# Patient Record
Sex: FEMALE | Race: WHITE | NOT HISPANIC OR LATINO | Employment: UNEMPLOYED | URBAN - METROPOLITAN AREA
[De-identification: names, ages, dates, MRNs, and addresses within clinical notes are randomized per-mention and may not be internally consistent; named-entity substitution may affect disease eponyms.]

---

## 2021-11-15 ENCOUNTER — HOSPITAL ENCOUNTER (EMERGENCY)
Facility: HOSPITAL | Age: 26
Discharge: HOME | End: 2021-11-15
Attending: EMERGENCY MEDICINE | Admitting: EMERGENCY MEDICINE

## 2021-11-15 VITALS
BODY MASS INDEX: 19.83 KG/M2 | SYSTOLIC BLOOD PRESSURE: 121 MMHG | HEART RATE: 78 BPM | OXYGEN SATURATION: 98 % | WEIGHT: 119.05 LBS | RESPIRATION RATE: 16 BRPM | TEMPERATURE: 98.2 F | DIASTOLIC BLOOD PRESSURE: 98 MMHG | HEIGHT: 65 IN

## 2021-11-15 DIAGNOSIS — F19.10 SUBSTANCE ABUSE (HCC): ICD-10-CM

## 2021-11-15 DIAGNOSIS — F41.0 ANXIETY ATTACK: Primary | ICD-10-CM

## 2021-11-15 LAB
BASE EXCESS BLDA CALC-SCNC: 2 MMOL/L (ref -5–5)
CA-I BLDA-SCNC: 1.32 MMOL/L (ref 1.2–1.32)
CO2 BLDA-SCNC: 29 MMOL/L (ref 24–29)
GLUCOSE BLDC GLUCOMTR-MCNC: 93 MG/DL (ref 70–130)
HCO3 BLDA-SCNC: 27.8 MMOL/L (ref 22–26)
HCT VFR BLDA CALC: 49 % (ref 38–51)
HGB BLDA-MCNC: 16.7 G/DL (ref 12–17)
PCO2 BLDA: 50.5 MM HG (ref 35–45)
PH BLDA: 7.35 PH UNITS (ref 7.35–7.6)
PO2 BLDA: 13 MMHG (ref 80–105)
POTASSIUM BLDA-SCNC: 3.7 MMOL/L (ref 3.5–4.9)
QT INTERVAL: 426 MS
QTC INTERVAL: 435 MS
SAO2 % BLDA: 13 % (ref 95–98)
SODIUM BLD-SCNC: 139 MMOL/L (ref 138–146)

## 2021-11-15 PROCEDURE — 99283 EMERGENCY DEPT VISIT LOW MDM: CPT

## 2021-11-15 PROCEDURE — 84295 ASSAY OF SERUM SODIUM: CPT

## 2021-11-15 PROCEDURE — 25010000002 LORAZEPAM PER 2 MG: Performed by: EMERGENCY MEDICINE

## 2021-11-15 PROCEDURE — 93005 ELECTROCARDIOGRAM TRACING: CPT | Performed by: EMERGENCY MEDICINE

## 2021-11-15 PROCEDURE — 96374 THER/PROPH/DIAG INJ IV PUSH: CPT

## 2021-11-15 PROCEDURE — 82947 ASSAY GLUCOSE BLOOD QUANT: CPT

## 2021-11-15 PROCEDURE — 85014 HEMATOCRIT: CPT

## 2021-11-15 PROCEDURE — 84132 ASSAY OF SERUM POTASSIUM: CPT

## 2021-11-15 PROCEDURE — 82803 BLOOD GASES ANY COMBINATION: CPT

## 2021-11-15 PROCEDURE — 82330 ASSAY OF CALCIUM: CPT

## 2021-11-15 RX ORDER — LORAZEPAM 2 MG/ML
1 INJECTION INTRAMUSCULAR ONCE
Status: DISCONTINUED | OUTPATIENT
Start: 2021-11-15 | End: 2021-11-15

## 2021-11-15 RX ORDER — LORAZEPAM 2 MG/ML
1 INJECTION INTRAMUSCULAR ONCE
Status: COMPLETED | OUTPATIENT
Start: 2021-11-15 | End: 2021-11-15

## 2021-11-15 RX ADMIN — LORAZEPAM 1 MG: 2 INJECTION INTRAMUSCULAR; INTRAVENOUS at 06:35

## 2021-11-15 NOTE — ED PROVIDER NOTES
"Subjective   26-year-old female presents for evaluation of \"anxiety attack.\"  Of note, the patient is from [place] and just came to [place] 3 days ago.  She states that 2 nights ago she used cocaine in addition to drinking alcohol and drinking red bull.  Since that time, she states that she is felt quite anxious and \"shaky.\"  She states that she is used cocaine before in the past but \"never this much before.\"  She denies any other coingestions aside from the after mentioned alcohol, cocaine, and red bull.  She states that last night she was unable to sleep \"felt ashamed and anxious.\"  As result, she came to the emergency department to be evaluated this morning.  She denies any suicidal ideation and aside from really anxious has no other complaints at this time.          Review of Systems   Psychiatric/Behavioral: The patient is nervous/anxious.    All other systems reviewed and are negative.      History reviewed. No pertinent past medical history.    No Known Allergies    Past Surgical History:   Procedure Laterality Date   • OVARIAN CYST SURGERY         History reviewed. No pertinent family history.    Social History     Socioeconomic History   • Marital status: Single   Tobacco Use   • Smoking status: Never Smoker   • Smokeless tobacco: Never Used   Substance and Sexual Activity   • Alcohol use: Yes   • Drug use: Yes     Types: Cocaine(coke)   • Sexual activity: Defer           Objective   Physical Exam  Vitals and nursing note reviewed.   Constitutional:       General: She is not in acute distress.     Appearance: Normal appearance. She is well-developed. She is not diaphoretic.      Comments: Well-appearing female in no acute distress   HENT:      Head: Normocephalic and atraumatic.   Eyes:      Pupils: Pupils are equal, round, and reactive to light.   Cardiovascular:      Rate and Rhythm: Normal rate and regular rhythm.      Heart sounds: Normal heart sounds. No murmur heard.  No friction rub. No gallop.  " "  Pulmonary:      Effort: Pulmonary effort is normal. No respiratory distress.      Breath sounds: Normal breath sounds. No wheezing or rales.   Abdominal:      General: Bowel sounds are normal. There is no distension.      Palpations: Abdomen is soft. There is no mass.      Tenderness: There is no abdominal tenderness. There is no guarding or rebound.   Musculoskeletal:         General: Normal range of motion.      Cervical back: Neck supple.   Skin:     General: Skin is warm and dry.      Findings: No erythema or rash.   Neurological:      General: No focal deficit present.      Mental Status: She is alert and oriented to person, place, and time.   Psychiatric:         Mood and Affect: Mood normal.         Thought Content: Thought content normal.         Judgment: Judgment normal.         Procedures           ED Course  ED Course as of 11/15/21 0641   Mon Nov 15, 2021   0639 26-year-old female presents for evaluation of \"anxiety attack.\"  2 nights ago, the patient used cocaine, alcohol, and red bull, and she notes that she is felt anxious since that time.  On arrival to the ED, the patient is well-appearing.  Benign exam.  Vital signs are reassuring.  The patient does not have medical insurance and is requesting that we keep her work-up as minimal as possible.  I feel that this is a reasonable request.  EKG revealed normal sinus rhythm with sinus arrhythmia and a heart rate of 63 with no ST segments suggestive of or concerning for ischemia.  Electrolytes are normal.  Upon reevaluation following Ativan, the patient feels improved.  Patient reassured.  Counseled regarding substance abuse cessation.  She was referred to primary care physicians to establish care and will follow-up within the next week.  Agreeable with plan and given appropriate strict return precautions. [DD]      ED Course User Index  [DD] Nii Russ MD                                   Recent Results (from the past 24 hour(s))   POC " Surgery Labs    Collection Time: 11/15/21  6:29 AM    Specimen: Blood   Result Value Ref Range    Ionized Calcium 1.32 1.20 - 1.32 mmol/L    POC Potassium 3.7 3.5 - 4.9 mmol/L    Sodium 139 138 - 146 mmol/L    Total CO2 29 24 - 29 mmol/L    Hemoglobin 16.7 12.0 - 17.0 g/dL    Hematocrit 49 38 - 51 %    pCO2, Arterial 50.5 (H) 35 - 45 mm Hg    pO2, Arterial 13 (L) 80 - 105 mmHg    Base Excess 2.0000 -5 - 5 mmol/L    O2 Saturation, Arterial 13 (L) 95 - 98 %    pH, Arterial 7.35 7.35 - 7.6 pH units    HCO3, Arterial 27.8 (H) 22 - 26 mmol/L    Glucose 93 70 - 130 mg/dL     Note: In addition to lab results from this visit, the labs listed above may include labs taken at another facility or during a different encounter within the last 24 hours. Please correlate lab times with ED admission and discharge times for further clarification of the services performed during this visit.    No orders to display     Vitals:    11/15/21 0600 11/15/21 0610 11/15/21 0630 11/15/21 0640   BP: 135/90      BP Location:       Patient Position:       Pulse: 64 65 78 66   Resp:       Temp:       TempSrc:       SpO2: 100% 98% 100% 100%   Weight:       Height:         Medications   LORazepam (ATIVAN) injection 1 mg (1 mg Intravenous Given 11/15/21 0635)     ECG/EMG Results (last 24 hours)     Procedure Component Value Units Date/Time    ECG 12 Lead [[id number]] Collected: 11/15/21 0629     Updated: 11/15/21 0630        ECG 12 Lead   Final Result   Test Reason : anxiety   Blood Pressure :   */*   mmHG   Vent. Rate :  63 BPM     Atrial Rate :  63 BPM      P-R Int : 136 ms          QRS Dur :  84 ms       QT Int : 426 ms       P-R-T Axes :  66  67  32 degrees      QTc Int : 435 ms      Normal sinus rhythm with sinus arrhythmia   Normal ECG   No previous ECGs available   Confirmed by MD Petrona, Nicolás ([id number]) on 11/15/2021 6:41:20 AM      Referred By:            Confirmed By: Nicolás Russ MD                  [facility]    Final diagnoses:   Anxiety attack    Substance abuse (HCC)       ED Disposition  ED Disposition     ED Disposition Condition Comment    Discharge Stable           PATIENT [facility] [id number]  [phone]  In 1 week           Medication List      No changes were made to your prescriptions during this visit.          Nii Russ MD  11/15/21 0676